# Patient Record
Sex: MALE | Race: ASIAN | URBAN - METROPOLITAN AREA
[De-identification: names, ages, dates, MRNs, and addresses within clinical notes are randomized per-mention and may not be internally consistent; named-entity substitution may affect disease eponyms.]

---

## 2020-07-25 ENCOUNTER — TELEPHONE ENCOUNTER (OUTPATIENT)
Dept: URBAN - METROPOLITAN AREA CLINIC 13 | Facility: CLINIC | Age: 55
End: 2020-07-25

## 2020-07-26 ENCOUNTER — TELEPHONE ENCOUNTER (OUTPATIENT)
Dept: URBAN - METROPOLITAN AREA CLINIC 13 | Facility: CLINIC | Age: 55
End: 2020-07-26

## 2020-07-26 RX ORDER — SIMVASTATIN 20 MG/1
TABLET, FILM COATED ORAL
Qty: 90 | Refills: 0 | Status: ACTIVE | COMMUNITY
Start: 2012-11-08

## 2020-07-26 RX ORDER — LISINOPRIL 10 MG/1
TABLET ORAL
Qty: 90 | Refills: 0 | Status: ACTIVE | COMMUNITY
Start: 2012-11-19

## 2021-08-27 ENCOUNTER — TELEPHONE ENCOUNTER (OUTPATIENT)
Dept: URBAN - METROPOLITAN AREA CLINIC 113 | Facility: CLINIC | Age: 56
End: 2021-08-27

## 2021-11-12 ENCOUNTER — TELEPHONE ENCOUNTER (OUTPATIENT)
Dept: URBAN - METROPOLITAN AREA CLINIC 113 | Facility: CLINIC | Age: 56
End: 2021-11-12

## 2022-01-26 ENCOUNTER — WEB ENCOUNTER (OUTPATIENT)
Dept: URBAN - METROPOLITAN AREA CLINIC 113 | Facility: CLINIC | Age: 57
End: 2022-01-26

## 2022-01-26 ENCOUNTER — LAB OUTSIDE AN ENCOUNTER (OUTPATIENT)
Dept: URBAN - METROPOLITAN AREA CLINIC 113 | Facility: CLINIC | Age: 57
End: 2022-01-26

## 2022-01-26 ENCOUNTER — OFFICE VISIT (OUTPATIENT)
Dept: URBAN - METROPOLITAN AREA CLINIC 113 | Facility: CLINIC | Age: 57
End: 2022-01-26
Payer: COMMERCIAL

## 2022-01-26 VITALS
TEMPERATURE: 98.2 F | RESPIRATION RATE: 20 BRPM | HEART RATE: 81 BPM | WEIGHT: 133 LBS | DIASTOLIC BLOOD PRESSURE: 85 MMHG | SYSTOLIC BLOOD PRESSURE: 148 MMHG | HEIGHT: 64 IN | BODY MASS INDEX: 22.71 KG/M2

## 2022-01-26 DIAGNOSIS — R19.5 HEME POSITIVE STOOL: ICD-10-CM

## 2022-01-26 DIAGNOSIS — K30 INDIGESTION: ICD-10-CM

## 2022-01-26 PROCEDURE — 99204 OFFICE O/P NEW MOD 45 MIN: CPT | Performed by: PHYSICIAN ASSISTANT

## 2022-01-26 RX ORDER — LISINOPRIL 10 MG/1
TABLET ORAL
Qty: 90 | Refills: 0 | Status: ACTIVE | COMMUNITY
Start: 2012-11-19

## 2022-01-26 RX ORDER — SODIUM, POTASSIUM,MAG SULFATES 17.5-3.13G
354 ML SOLUTION, RECONSTITUTED, ORAL ORAL ONCE
Qty: 354 ML | Refills: 0 | OUTPATIENT
Start: 2022-01-26 | End: 2022-01-27

## 2022-01-26 RX ORDER — OMEPRAZOLE 40 MG/1
1 CAPSULE 30 MINUTES BEFORE MORNING MEAL CAPSULE, DELAYED RELEASE ORAL ONCE A DAY
Qty: 30 | OUTPATIENT
Start: 2022-01-27

## 2022-01-26 RX ORDER — SIMVASTATIN 20 MG/1
TABLET, FILM COATED ORAL
Qty: 90 | Refills: 0 | Status: ACTIVE | COMMUNITY
Start: 2012-11-08

## 2022-01-26 NOTE — HPI-TODAY'S VISIT:
Mr. Odell is a 56-year-old gentleman with a history of hyperlipidemia, hypertension, nephrolithiasis, referred by Dr. Dee, to discuss colonoscopy.  A copy of this document will be sent to referring provider. Per referral notes, patient with a history of heme positive stools x3 in July 2021. Labs today (6/30/2021):CMP demonstrated glucose 105, creatinine 0.77, sodium 141, potassium 4.0, T bili 0.3, alk phos 57, AST 20, ALT 26, TSH 1.170.  CMP demonstrated WBC 7.0, RBC 4.5, hemoglobin 14.2, hematocrit 40.2, MCV 90.1, platelet  332. Today he denies any complaints of bright red blood per rectum, melena or hematochezia.  He states that he feels fairly well overall.  On occasion he has "stomach upset" after consuming certain meals.  He is unable to identify or recall any specific dietary triggers.  No nausea or vomiting.  He denies issues with heartburn or acid reflux.  He is not on PPI therapy regarding his bowel habits, he has 2 soft, formed bowel movements daily.  No nocturnal diarrhea.  His weight remains stable. He denies any known family history of colon cancer, inflammatory bowel disease, advanced colon polyps or other GI malignancy..  He states that he is followed by Dr. Virgen for history of kidney stones.  He is not on blood thinners.  He denies chest pain, shortness of breath or dyspnea on exertion.

## 2022-02-07 ENCOUNTER — DASHBOARD ENCOUNTERS (OUTPATIENT)
Age: 57
End: 2022-02-07

## 2022-02-07 PROBLEM — 275978004 COLON CANCER SCREENING: Status: ACTIVE | Noted: 2022-02-02

## 2022-02-07 PROBLEM — 59614000: Status: ACTIVE | Noted: 2022-02-07

## 2022-02-07 PROBLEM — 162031009: Status: ACTIVE | Noted: 2022-01-27

## 2022-02-08 ENCOUNTER — OFFICE VISIT (OUTPATIENT)
Dept: URBAN - METROPOLITAN AREA SURGERY CENTER 25 | Facility: SURGERY CENTER | Age: 57
End: 2022-02-08